# Patient Record
Sex: FEMALE | Race: BLACK OR AFRICAN AMERICAN | Employment: FULL TIME | URBAN - METROPOLITAN AREA
[De-identification: names, ages, dates, MRNs, and addresses within clinical notes are randomized per-mention and may not be internally consistent; named-entity substitution may affect disease eponyms.]

---

## 2021-11-30 ENCOUNTER — HOSPITAL ENCOUNTER (EMERGENCY)
Age: 26
Discharge: HOME | End: 2021-11-30
Attending: EMERGENCY MEDICINE
Payer: COMMERCIAL

## 2021-11-30 VITALS
RESPIRATION RATE: 16 BRPM | DIASTOLIC BLOOD PRESSURE: 65 MMHG | OXYGEN SATURATION: 100 % | BODY MASS INDEX: 33.99 KG/M2 | TEMPERATURE: 98.5 F | WEIGHT: 199.1 LBS | HEART RATE: 93 BPM | HEIGHT: 64 IN | SYSTOLIC BLOOD PRESSURE: 122 MMHG

## 2021-11-30 DIAGNOSIS — L02.31 ABSCESS OF BUTTOCK: Primary | ICD-10-CM

## 2021-11-30 PROCEDURE — 2500000003 HC RX 250 WO HCPCS: Performed by: EMERGENCY MEDICINE

## 2021-11-30 PROCEDURE — 6370000000 HC RX 637 (ALT 250 FOR IP): Performed by: EMERGENCY MEDICINE

## 2021-11-30 PROCEDURE — 10060 I&D ABSCESS SIMPLE/SINGLE: CPT

## 2021-11-30 PROCEDURE — 99283 EMERGENCY DEPT VISIT LOW MDM: CPT

## 2021-11-30 RX ORDER — SULFAMETHOXAZOLE AND TRIMETHOPRIM 800; 160 MG/1; MG/1
2 TABLET ORAL ONCE
Status: COMPLETED | OUTPATIENT
Start: 2021-11-30 | End: 2021-11-30

## 2021-11-30 RX ORDER — SULFAMETHOXAZOLE AND TRIMETHOPRIM 800; 160 MG/1; MG/1
2 TABLET ORAL 2 TIMES DAILY
Qty: 28 TABLET | Refills: 0 | Status: SHIPPED | OUTPATIENT
Start: 2021-11-30 | End: 2021-12-07

## 2021-11-30 RX ORDER — LIDOCAINE HYDROCHLORIDE 20 MG/ML
5 INJECTION, SOLUTION INFILTRATION; PERINEURAL ONCE
Status: COMPLETED | OUTPATIENT
Start: 2021-11-30 | End: 2021-11-30

## 2021-11-30 RX ORDER — IBUPROFEN 600 MG/1
600 TABLET ORAL EVERY 6 HOURS PRN
Qty: 40 TABLET | Refills: 0 | Status: SHIPPED | OUTPATIENT
Start: 2021-11-30 | End: 2022-05-20 | Stop reason: ALTCHOICE

## 2021-11-30 RX ADMIN — SULFAMETHOXAZOLE AND TRIMETHOPRIM 2 TABLET: 800; 160 TABLET ORAL at 23:06

## 2021-11-30 RX ADMIN — LIDOCAINE HYDROCHLORIDE 5 ML: 20 INJECTION, SOLUTION INFILTRATION; PERINEURAL at 23:06

## 2021-11-30 ASSESSMENT — PAIN DESCRIPTION - LOCATION: LOCATION: BUTTOCKS

## 2021-11-30 ASSESSMENT — PAIN DESCRIPTION - FREQUENCY: FREQUENCY: CONTINUOUS

## 2021-11-30 ASSESSMENT — PAIN SCALES - GENERAL
PAINLEVEL_OUTOF10: 4
PAINLEVEL_OUTOF10: 10
PAINLEVEL_OUTOF10: 10

## 2021-11-30 ASSESSMENT — PAIN DESCRIPTION - ONSET: ONSET: PROGRESSIVE

## 2021-11-30 ASSESSMENT — PAIN DESCRIPTION - DESCRIPTORS: DESCRIPTORS: THROBBING

## 2021-11-30 ASSESSMENT — PAIN DESCRIPTION - ORIENTATION: ORIENTATION: RIGHT

## 2021-11-30 ASSESSMENT — PAIN - FUNCTIONAL ASSESSMENT: PAIN_FUNCTIONAL_ASSESSMENT: 0-10

## 2021-11-30 ASSESSMENT — PAIN DESCRIPTION - PROGRESSION
CLINICAL_PROGRESSION: GRADUALLY IMPROVING
CLINICAL_PROGRESSION: GRADUALLY WORSENING

## 2021-11-30 ASSESSMENT — PAIN DESCRIPTION - PAIN TYPE: TYPE: ACUTE PAIN

## 2021-12-01 NOTE — ED PROVIDER NOTES
CHIEF COMPLAINT  Abscess (Buttock)      HISTORY OF PRESENT ILLNESS  Helena Castaneda  is a 27 y.o. female who presents to the ED at via private vehicle complaining of buttocks abscess. Patient states that she has noted increasing pain along the right medial buttocks over the last 2 days. She denies fevers, chills, or sweats. Pain is worse with sitting. No previous history of similar. There are no other complaints, modifying factors or associated symptoms. Nursing notes reviewed. Past medical history:  has no past medical history on file. Past surgical history:  has no past surgical history on file. Home medications:   Prior to Admission medications    Medication Sig Start Date End Date Taking? Authorizing Provider   ibuprofen (IBU) 600 MG tablet Take 1 tablet by mouth every 6 hours as needed for Pain 11/30/21  Yes Verlyn Ee, DO   sulfamethoxazole-trimethoprim (BACTRIM DS;SEPTRA DS) 800-160 MG per tablet Take 2 tablets by mouth 2 times daily for 7 days 11/30/21 12/7/21 Yes Verlyn Ee, DO       No Known Allergies    Social history:  reports that she has never smoked. She has never used smokeless tobacco. She reports current alcohol use. She reports that she does not use drugs. Family history:  History reviewed. No pertinent family history. REVIEW OF SYSTEMS  6 systems reviewed, pertinent positives per HPI otherwise noted to be negative    PHYSICAL EXAM  Vitals:    11/30/21 2234   BP: 122/65   Pulse: 93   Resp: 16   Temp: 98.5 °F (36.9 °C)   SpO2: 100%       GENERAL: Patient is well-developed, well-nourished,  no acute distress. Moderate apparent discomfort. Non toxic appearing. HEENT:  Normocephalic, atraumatic. PERRL. Conjunctiva appear normal.  External ears are normal.  MMM  NECK: Supple with normal ROM. Trachea midline  LUNGS:  Normal work of breathing. Speaking comfortably in full sentences. EXTREMITIES: 2+ distal pulses w/o edema.     MUSCULOSKELETAL:  Atraumatic extremities with normal ROM grossly. No obvious bony deformities. SKIN: 2 cm abscess noted along the medial aspect of the right buttocks. No evidence of perirectal involvement. Warm/dry. No rashes/lesions noted. PSYCHIATRIC: Patient is alert and oriented with normal affect  NEUROLOGIC: Cranial nerves grossly intact. Moves all extremities with equal strength. No gross sensory deficits. Answers questions/follows commands appropriately. ED COURSE/MDM  Nursing notes reviewed. Pt was given the following medications or treatments in the ED:       PROCEDURE:  INCISION & DRAINAGE  Dilip Plascencia or their surrogate had an opportunity to ask questions, and the risks, benefits, and alternatives were discussed. The abscess was prepped and draped to maintain a sterile field. A local anesthetic was used to completely anesthetize the abscess. A stab incision was made with significant purulent discharge. Approximately 10 to 15 cc of purulent discharge was able to be expressed from the abscess. There were no complications during the procedure. Bactrim provided. Clinical Impression  Based on the presenting complaint, history, and physical exam, multiple diagnoses were considered. Exam and workup here most c/w:  1. Abscess of buttock        I discussed with Dilip Plascencia the results of evaluation in the ED, diagnosis, care, and prognosis. The plan is to discharge to home. Patient is in agreement with plan and questions have been answered. I also discussed with Dilip Plascencia the reasons which may require a return visit and the importance of follow-up care. The patient is well-appearing, nontoxic, and improved at the time of discharge. Patient agrees to call to arrange follow-up care as directed. Shaysonalibonnie Plascencia understands to return immediately for worsening/change in symptoms.       Patient will be started on the following medications from the ED:  Discharge Medication List as of 11/30/2021 11:20 PM START taking these medications    Details   ibuprofen (IBU) 600 MG tablet Take 1 tablet by mouth every 6 hours as needed for Pain, Disp-40 tablet, R-0Print      sulfamethoxazole-trimethoprim (BACTRIM DS;SEPTRA DS) 800-160 MG per tablet Take 2 tablets by mouth 2 times daily for 7 days, Disp-28 tablet, R-0Print               Disposition  Pt is discharged in stable condition.     Disposition Vitals:  /65   Pulse 93   Temp 98.5 °F (36.9 °C) (Oral)   Resp 16   Ht 5' 4\" (1.626 m)   Wt 199 lb 1.6 oz (90.3 kg)   SpO2 100%   BMI 34.18 kg/m²                     Ana Aguirre DO  12/01/21 0017

## 2022-05-20 ENCOUNTER — HOSPITAL ENCOUNTER (EMERGENCY)
Age: 27
Discharge: HOME | End: 2022-05-20
Attending: EMERGENCY MEDICINE
Payer: COMMERCIAL

## 2022-05-20 ENCOUNTER — APPOINTMENT (OUTPATIENT)
Dept: GENERAL RADIOLOGY | Age: 27
End: 2022-05-20
Payer: COMMERCIAL

## 2022-05-20 VITALS
BODY MASS INDEX: 33.57 KG/M2 | RESPIRATION RATE: 12 BRPM | OXYGEN SATURATION: 98 % | HEIGHT: 65 IN | WEIGHT: 201.5 LBS | SYSTOLIC BLOOD PRESSURE: 143 MMHG | DIASTOLIC BLOOD PRESSURE: 83 MMHG | HEART RATE: 87 BPM | TEMPERATURE: 98.7 F

## 2022-05-20 DIAGNOSIS — M25.572 ACUTE LEFT ANKLE PAIN: Primary | ICD-10-CM

## 2022-05-20 PROCEDURE — 73610 X-RAY EXAM OF ANKLE: CPT

## 2022-05-20 PROCEDURE — 99283 EMERGENCY DEPT VISIT LOW MDM: CPT

## 2022-05-20 ASSESSMENT — PAIN - FUNCTIONAL ASSESSMENT: PAIN_FUNCTIONAL_ASSESSMENT: NONE - DENIES PAIN

## 2022-05-20 NOTE — ED PROVIDER NOTES
CHIEF COMPLAINT  Ankle Pain (left ankle pain and swelling for the past few days)      HISTORY OF PRESENT ILLNESS  Latia Ellis  is a 24 y.o. female who presents to the ED complaining of left ankle swelling that she has noticed over the past several days. She states she has a new job working as a  and is on her feet a lot. She has noticed some swelling and pain in the left ankle after she is on her feet all day. She denies any known injury. No fever. There are no other complaints, modifying factors or associated symptoms. Nursing notes reviewed. Past medical history:  has no past medical history on file. Past surgical history:  has no past surgical history on file. Home medications:   Prior to Admission medications    Not on File       No Known Allergies    Social history:  reports that she has never smoked. She has never used smokeless tobacco. She reports current alcohol use. She reports that she does not use drugs. Family history:  History reviewed. No pertinent family history. REVIEW OF SYSTEMS  6 systems reviewed, pertinent positives per HPI otherwise noted to be negative    PHYSICAL EXAM  Vitals:    05/20/22 1615   BP: (!) 143/83   Pulse: 87   Resp: 12   Temp: 98.7 °F (37.1 °C)   SpO2: 98%     GENERAL APPEARANCE: Awake and alert. Cooperative. No acute distress. HEENT:  Normocephalic, atraumatic. PERRL. Conjunctiva appear normal.  External ears are normal.  MMM  NECK: Supple with normal ROM. Trachea midline  HEART: Regular rate. LUNGS:  Normal work of breathing. Speaking comfortably in full sentences. ABDOMEN: Non-distended. EXTREMITIES: 2+ distal pulses w/o edema. MUSCULOSKELETAL:  Atraumatic extremities with normal ROM grossly. No obvious bony deformities. No appreciable swelling noted that is asymmetric. Both of her ankles are slightly swollen but they appear quite symmetric. No warmth or erythema over the left ankle.   Both feet are neurovascularly intact. No calf swelling or tenderness bilaterally. SKIN: Warm/dry. No rashes/lesions noted. PSYCHIATRIC: Patient is alert and oriented with normal affect  NEUROLOGIC: Cranial nerves grossly intact. Moves all extremities with equal strength. No gross sensory deficits. Answers questions/follows commands appropriately. ED COURSE/MDM  Nursing notes reviewed. Here the patient is afebrile with normal vitals. Patient is well appearing. Here the patient's ankles are quite symmetric. I really cannot appreciate much asymmetric swelling. She has trace swelling noted to both ankles. No erythema or warmth. I do not suspect a septic joint. No calf swelling or tenderness. I do not suspect DVT. Left ankle x-ray shows no bony injury. I have given her an Ace wrap to wear and instructed her to elevate her leg is much as possible and take a couple days off work and take NSAIDs. I have instructed her to also wear the Ace wrap while at work. This may help with her swelling. Primary care follow-up recommended. Clinical Impression  Based on the presenting complaint, history, and physical exam, multiple diagnoses were considered. Exam and workup here most c/w:  1. Acute left ankle pain        I discussed with the patient, the results of evaluation in the ED, diagnosis, care, and prognosis. The plan is to discharge to home. Patient is in agreement with plan and questions have been answered. I also discussed the reasons which may require a return visit and the importance of follow-up care with the patient. The patient is well-appearing, nontoxic, and improved at the time of discharge. Patient agrees to call to arrange follow-up care as directed. The patient understands to return immediately for worsening/change in symptoms. Patient will be started on the following medications from the ED:  There are no discharge medications for this patient.         Disposition  Pt is discharged in stable condition.     Disposition Vitals:  BP (!) 143/83   Pulse 87   Temp 98.7 °F (37.1 °C) (Oral)   Resp 12   Ht 5' 4.5\" (1.638 m)   Wt 201 lb 8 oz (91.4 kg)   SpO2 98%   BMI 34.05 kg/m²         Jj Ivey MD  05/21/22 8266

## 2022-05-20 NOTE — Clinical Note
Brigido Mills was seen and treated in our emergency department on 5/20/2022. She may return to work on 05/23/2022. If you have any questions or concerns, please don't hesitate to call.       Sharita Brown MD

## 2022-05-20 NOTE — ED NOTES
Pt states that she has been having bilateral feet pain for months since she changed jobs and is on her feet all the time and a few days ago she noticed that she had left ankle swelling with no known injury.      Viky Spencer RN  05/20/22 7662

## 2022-06-16 ENCOUNTER — HOSPITAL ENCOUNTER (EMERGENCY)
Age: 27
Discharge: LEFT BEFORE DISCHARGE | End: 2022-06-16

## 2022-06-16 VITALS
BODY MASS INDEX: 33.41 KG/M2 | DIASTOLIC BLOOD PRESSURE: 88 MMHG | SYSTOLIC BLOOD PRESSURE: 139 MMHG | HEART RATE: 83 BPM | TEMPERATURE: 98.8 F | OXYGEN SATURATION: 99 % | WEIGHT: 200.56 LBS | RESPIRATION RATE: 16 BRPM | HEIGHT: 65 IN

## 2022-06-16 NOTE — ED TRIAGE NOTES
26y/o female presents to the ED with left ankle pain. Pt states she was seen for the same 2wks ago. She states that she has been keeping elevated with no relief of swelling. Pt state she is able to bare weight on foot but is painful +swelling to ankle.  No deformity

## 2022-06-21 ENCOUNTER — HOSPITAL ENCOUNTER (EMERGENCY)
Age: 27
Discharge: HOME | End: 2022-06-21
Attending: EMERGENCY MEDICINE
Payer: COMMERCIAL

## 2022-06-21 VITALS
WEIGHT: 207.7 LBS | HEIGHT: 65 IN | HEART RATE: 88 BPM | TEMPERATURE: 98.2 F | BODY MASS INDEX: 34.6 KG/M2 | RESPIRATION RATE: 10 BRPM | SYSTOLIC BLOOD PRESSURE: 130 MMHG | DIASTOLIC BLOOD PRESSURE: 81 MMHG | OXYGEN SATURATION: 99 %

## 2022-06-21 DIAGNOSIS — M25.572 ACUTE LEFT ANKLE PAIN: Primary | ICD-10-CM

## 2022-06-21 PROCEDURE — 99283 EMERGENCY DEPT VISIT LOW MDM: CPT

## 2022-06-21 RX ORDER — IBUPROFEN 600 MG/1
600 TABLET ORAL EVERY 6 HOURS PRN
Qty: 40 TABLET | Refills: 0 | Status: SHIPPED | OUTPATIENT
Start: 2022-06-21

## 2022-06-22 NOTE — ED PROVIDER NOTES
CHIEF COMPLAINT  Left ankle pain. HISTORY OF PRESENT ILLNESS  Amisha Garrett  is a 24 y.o. female who presents to the ED at via private vehicle complaining of left ankle pain. Patient denies known trauma or injury. She reports that she has noted increasing left lateral ankle pain since starting her job at [employer] approximately 6 months ago. Patient states that she is on her feet all day long. She reports poor footwear as well as admitting to stating \"flat-footed. \"Patient was seen evaluated recently and had reportedly negative ankle imaging. Pain continues at this time. There are no other complaints, modifying factors or associated symptoms. Nursing notes reviewed. Past medical history:  has no past medical history on file. Past surgical history:  has no past surgical history on file. Home medications:   Prior to Admission medications    Medication Sig Start Date End Date Taking? Authorizing Provider   ibuprofen (IBU) 600 MG tablet Take 1 tablet by mouth every 6 hours as needed for Pain 6/21/22  Yes Alex Hoffmanari, DO       No Known Allergies    Social history:  reports that she has never smoked. She has never used smokeless tobacco. She reports current alcohol use. She reports that she does not use drugs. Family history:  No family history on file. REVIEW OF SYSTEMS  6 systems reviewed, pertinent positives per HPI otherwise noted to be negative    PHYSICAL EXAM  Vitals:    06/21/22 1944   BP: 130/81   Pulse: 88   Resp: 10   Temp: 98.2 °F (36.8 °C)   SpO2: 99%       GENERAL: Patient is well-developed, well-nourished,  no acute distress. Moderate apparent discomfort. Non toxic appearing. HEENT:  Normocephalic, atraumatic. PERRL. Conjunctiva appear normal.  External ears are normal.  MMM  NECK: Supple with normal ROM. Trachea midline  LUNGS:  Normal work of breathing. Speaking comfortably in full sentences. EXTREMITIES: 2+ distal pulses w/o edema. MUSCULOSKELETAL: Left lower extremity: Hip, and knee within normal limits. Full range of motion without difficulty. Left ankle: Full range of motion. Moderate discomfort with eversion. Point tenderness at the tip of the lateral malleolus. No gross swelling or erythema. No obvious swelling. Atraumatic extremities with normal ROM grossly. No obvious bony deformities. SKIN: Warm/dry. No rashes/lesions noted. PSYCHIATRIC: Patient is alert and oriented with normal affect  NEUROLOGIC: Cranial nerves grossly intact. Moves all extremities with equal strength. No gross sensory deficits. Answers questions/follows commands appropriately. ED COURSE/MDM  Nursing notes reviewed. Pt was given the following medications or treatments in the ED:     Left ankle brace provided. Clinical Impression  Based on the presenting complaint, history, and physical exam, multiple diagnoses were considered. Exam and workup here most c/w:  1. Acute left ankle pain        I discussed with Dilip Valencia the results of evaluation in the ED, diagnosis, care, and prognosis. The plan is to discharge to home. Patient is in agreement with plan and questions have been answered. I also discussed with Dilip Valencia the reasons which may require a return visit and the importance of follow-up care. The patient is well-appearing, nontoxic, and improved at the time of discharge. Patient agrees to call to arrange follow-up care as directed. Dilip Valencia understands to return immediately for worsening/change in symptoms. Patient will be started on the following medications from the ED:  New Prescriptions    IBUPROFEN (IBU) 600 MG TABLET    Take 1 tablet by mouth every 6 hours as needed for Pain         Disposition  Pt is discharged in stable condition.     Disposition Vitals:  /81   Pulse 88   Temp 98.2 °F (36.8 °C) (Oral)   Resp 10   Ht 5' 4.5\" (1.638 m)   Wt 207 lb 11.2 oz (94.2 kg)   SpO2 99%   BMI 35.10 kg/m²                    Rusty Good DO  06/21/22 2005

## 2022-09-03 ENCOUNTER — HOSPITAL ENCOUNTER (EMERGENCY)
Age: 27
Discharge: HOME | End: 2022-09-03
Attending: EMERGENCY MEDICINE
Payer: COMMERCIAL

## 2022-09-03 ENCOUNTER — APPOINTMENT (OUTPATIENT)
Dept: GENERAL RADIOLOGY | Age: 27
End: 2022-09-03
Payer: COMMERCIAL

## 2022-09-03 VITALS
HEIGHT: 65 IN | OXYGEN SATURATION: 99 % | HEART RATE: 78 BPM | TEMPERATURE: 97.9 F | BODY MASS INDEX: 33.49 KG/M2 | RESPIRATION RATE: 10 BRPM | WEIGHT: 201 LBS | DIASTOLIC BLOOD PRESSURE: 80 MMHG | SYSTOLIC BLOOD PRESSURE: 147 MMHG

## 2022-09-03 DIAGNOSIS — M79.604 RIGHT LEG PAIN: Primary | ICD-10-CM

## 2022-09-03 PROCEDURE — 99283 EMERGENCY DEPT VISIT LOW MDM: CPT

## 2022-09-03 PROCEDURE — 73552 X-RAY EXAM OF FEMUR 2/>: CPT

## 2022-09-03 NOTE — ED PROVIDER NOTES
[facility] PROVIDER NOTE    Patient Identification  Pt Name: Eliezer Smalls  MRN: [id number]  Erwingfilsa 1995  Date of evaluation: 9/3/2022  Provider: Lee Munoz MD  PCP: Ninfa Perkins MD    Chief Complaint  Leg Pain (Complains of right upper thigh pain. Onset around 0100. Took two Ibuprofen around 0400 with no relief. Has had no travel recently. Describes pain as burning.)      HPI  History provided by patient   This is a 24 y.o. female who presents to the ED for right upper thigh pain. Medial in location. No pain anywhere else. Unchanged by movement of the hip, knee, ankle, toes. No difficulty walking. Never had this before. No other symptoms. No fevers or chills or cough. Normal bowel and bladder movements. Denies prior medical problems. Yesterday she was up on her feet all day. She was wearing shorts that were very constricting at the top. No numbness or tingling. No long plane or car ride. No family history of blood clotting disorders. No recent surgeries. No trauma. No back pain. No saddle anesthesia    ROS  12 systems reviewed, pertinent positives/negatives per HPI otherwise noted to be negative. I have reviewed the following nursing documentation:  Allergies: Patient has no known allergies. Past medical history: History reviewed. No pertinent past medical history. Past surgical history: History reviewed. No pertinent surgical history. Home medications:   Previous Medications    IBUPROFEN (IBU) 600 MG TABLET    Take 1 tablet by mouth every 6 hours as needed for Pain       Social history:  reports that she has never smoked. She has never used smokeless tobacco. She reports current alcohol use. She reports that she does not use drugs. Family history:  History reviewed. No pertinent family history.       Exam  ED Triage Vitals [09/03/22 0748]   BP Temp Temp Source Heart Rate Resp SpO2 Height Weight   (!) 147/80 97.9 °F (36.6 °C) Oral 78 10 99 % 5' 4.5\" (1.638 m) 201 lb (91.2 kg)     Nursing note and vitals reviewed. Constitutional: In no acute distress  HENT:      Head: Normocephalic      Ears: External ears normal.      Nose: Nose normal.     Mouth: Membrane mucosa moist   Eyes: No discharge. Neck: Supple. Trachea midline. Cardiovascular: Regular rate. Warm extremities  Pulmonary/Chest: Effort normal. No respiratory distress. Abdominal: Soft. No distension. Nontender  : Deferred  Rectal: Deferred   Musculoskeletal: Moves all extremities. No gross deformity. 5 out of 5 strength bilateral hip, knee, ankle, big toe flexors/extensors. Normal sensation light palpation throughout lower extremities. 2+ DP/PT pulse. Normal patellar/Achilles reflexes  Neurological: Alert and oriented. Face symmetric. Speech is clear. Skin: Warm and dry. Psychiatric: Normal mood and affect. Behavior is normal.    Procedures        Radiology  VL Extremity Venous Right    (Results Pending)       Labs  No results found for this visit on 09/03/22. Screenings   [place] Coma Scale  Eye Opening: Spontaneous  Best Verbal Response: Oriented  Best Motor Response: Obeys commands  [place] Coma Scale Score: 15       MDM and ED Course  This is a 24 y.o. female who presents to the ED for right medial thigh burning discomfort. No swelling or rash noted. Could potentially be shingles if rash develops. No swelling to indicate DVT. Unfortunately we do not have DVT study available in this facility. Will refer patient to get this as outpatient. Good pulses and warm foot therefore doubt peripheral arterial disease. No trauma. Will obtain x-ray. No pain at the joints so I doubt effusion. She was wearing a very constricting set of shorts all day yesterday therefore this may be peripheral neuropathy.      Because of my low clinical suspicion and since well score is so low, I do not believe that it is in patient's best interest to receive empiric anticoagulation before ultrasound technology.         Jimmy Ann MD  09/03/22 3996

## 2022-09-03 NOTE — DISCHARGE INSTRUCTIONS
Please return if you have any new, worsening, or concerning symptoms like inability to eat/drink/walk, fevers, rash, swelling, trouble breathing, chest pain. Contact your primary care physician tomorrow to make a follow up appointment this week.  Talk about getting the ultrasound of your leg

## 2023-01-23 ENCOUNTER — APPOINTMENT (OUTPATIENT)
Dept: GENERAL RADIOLOGY | Age: 28
End: 2023-01-23
Payer: COMMERCIAL

## 2023-01-23 ENCOUNTER — HOSPITAL ENCOUNTER (EMERGENCY)
Age: 28
Discharge: HOME | End: 2023-01-23
Attending: EMERGENCY MEDICINE
Payer: COMMERCIAL

## 2023-01-23 VITALS
TEMPERATURE: 98 F | HEIGHT: 64 IN | RESPIRATION RATE: 16 BRPM | BODY MASS INDEX: 34.66 KG/M2 | WEIGHT: 203 LBS | DIASTOLIC BLOOD PRESSURE: 73 MMHG | HEART RATE: 96 BPM | SYSTOLIC BLOOD PRESSURE: 137 MMHG | OXYGEN SATURATION: 100 %
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
## 2023-01-23 DIAGNOSIS — M25.552 ACUTE HIP PAIN, LEFT: ICD-10-CM

## 2023-01-23 DIAGNOSIS — M79.18 LEFT BUTTOCK PAIN: Primary | ICD-10-CM

## 2023-01-23 LAB — HCG(URINE) PREGNANCY TEST: NEGATIVE

## 2023-01-23 PROCEDURE — 84703 CHORIONIC GONADOTROPIN ASSAY: CPT

## 2023-01-23 PROCEDURE — 99284 EMERGENCY DEPT VISIT MOD MDM: CPT

## 2023-01-23 PROCEDURE — 73502 X-RAY EXAM HIP UNI 2-3 VIEWS: CPT

## 2023-01-23 PROCEDURE — 6370000000 HC RX 637 (ALT 250 FOR IP): Performed by: EMERGENCY MEDICINE

## 2023-01-23 RX ORDER — METHOCARBAMOL 500 MG/1
1000 TABLET, FILM COATED ORAL ONCE
Status: COMPLETED | OUTPATIENT
Start: 2023-01-23 | End: 2023-01-23

## 2023-01-23 RX ORDER — ACETAMINOPHEN 325 MG/1
650 TABLET ORAL ONCE
Status: COMPLETED | OUTPATIENT
Start: 2023-01-23 | End: 2023-01-23

## 2023-01-23 RX ORDER — METHOCARBAMOL 500 MG/1
500-1000 TABLET, FILM COATED ORAL 3 TIMES DAILY PRN
Qty: 8 TABLET | Refills: 0 | Status: SHIPPED | OUTPATIENT
Start: 2023-01-23 | End: 2023-01-25

## 2023-01-23 RX ORDER — LIDOCAINE 4 G/G
1 PATCH TOPICAL DAILY
Status: DISCONTINUED | OUTPATIENT
Start: 2023-01-23 | End: 2023-01-23 | Stop reason: HOSPADM

## 2023-01-23 RX ADMIN — METHOCARBAMOL 1000 MG: 500 TABLET ORAL at 10:05

## 2023-01-23 RX ADMIN — ACETAMINOPHEN 650 MG: 325 TABLET ORAL at 10:05

## 2023-01-23 ASSESSMENT — ENCOUNTER SYMPTOMS
BACK PAIN: 0
VOMITING: 0
ABDOMINAL PAIN: 0
SHORTNESS OF BREATH: 0

## 2023-01-23 ASSESSMENT — PAIN DESCRIPTION - FREQUENCY: FREQUENCY: CONTINUOUS

## 2023-01-23 ASSESSMENT — PAIN DESCRIPTION - LOCATION: LOCATION: BUTTOCKS

## 2023-01-23 ASSESSMENT — PAIN - FUNCTIONAL ASSESSMENT
PAIN_FUNCTIONAL_ASSESSMENT: NONE - DENIES PAIN
PAIN_FUNCTIONAL_ASSESSMENT: 0-10

## 2023-01-23 ASSESSMENT — PAIN DESCRIPTION - PAIN TYPE: TYPE: ACUTE PAIN

## 2023-01-23 ASSESSMENT — PAIN DESCRIPTION - ORIENTATION: ORIENTATION: LEFT

## 2023-01-23 ASSESSMENT — PAIN SCALES - GENERAL: PAINLEVEL_OUTOF10: 9

## 2023-01-23 ASSESSMENT — PAIN DESCRIPTION - DESCRIPTORS: DESCRIPTORS: ACHING

## 2023-01-23 NOTE — DISCHARGE INSTRUCTIONS
Take Tylenol or ibuprofen as needed for pain. Do not take more than 3 g of Tylenol per day. Try over-the-counter Lidoderm patches as needed. Use ice for swelling. Try Robaxin as needed for muscle relaxer but do not drive with this. Follow-up with your primary doctor or orthopedics over the next 1 to 2 weeks if pain persist for repeat evaluation and additional imaging. Try massaging the area to see if this helps and stretching. Return to the emergency department for worsening buttock pain associated with weakness in the legs, new onset abdominal pain with vomiting, inability to walk, or any other concerns.

## 2023-01-23 NOTE — ED PROVIDER NOTES
[address] [address] ENCOUNTER        Pt Name: Eduar Dai  MRN: [id number]  [place] 1995  Date of evaluation: 1/23/2023  Provider: Kath Colorado MD  PCP: Lisa Kuo MD      [address] [place] [address]       Chief Complaint   Patient presents with    Buttocks Pain     Left buttock pain from fall       HISTORY OFPRESENT ILLNESS   (Location/Symptom, Timing/Onset, Context/Setting, Quality, Duration, Modifying Factors,Severity)  Note limiting factors. Eduar Dai is a 24 y.o. female presenting today due to concern for left buttock/hip pain since falling at work after slipping 2 days ago and landing directly on her left buttock and having pain with sitting ever since. She denied any pain prior to the slip and the pain came on relatively suddenly although it did seem to worsen over the last day. She denies any numbness or weakness in the legs. No abdominal pain or back pain. No chest pain. She did not hit her head and denies any headache or neck pain. She tried ibuprofen but states that was not helping much. Due to persistent left buttock pain, she came with her friend to the emergency department for further evaluation. She denies any concern with abscess. REVIEW OF SYSTEMS    (2-9 systems for level 4, 10 or more for level 5)     Review of Systems   Constitutional:  Negative for chills and fever. Respiratory:  Negative for shortness of breath. Cardiovascular:  Negative for chest pain and leg swelling. Gastrointestinal:  Negative for abdominal pain and vomiting. Genitourinary:  Negative for flank pain and pelvic pain. Musculoskeletal:  Positive for arthralgias (left hip) and gait problem (due to left buttock pain only). Negative for back pain, joint swelling and neck pain. Skin:  Negative for wound. Neurological:  Negative for syncope (no LOC), weakness, light-headedness, numbness and headaches.    Psychiatric/Behavioral:  Negative for confusion. Positives and Pertinent negatives as per HPI. PASTMEDICAL HISTORY   History reviewed. No pertinent past medical history. SURGICAL HISTORY     History reviewed. No pertinent surgical history. CURRENT MEDICATIONS       Discharge Medication List as of 1/23/2023 11:00 AM        CONTINUE these medications which have NOT CHANGED    Details   ibuprofen (IBU) 600 MG tablet Take 1 tablet by mouth every 6 hours as needed for Pain, Disp-40 tablet, R-0Normal             ALLERGIES     Patient has no known allergies. FAMILY HISTORY     History reviewed. No pertinent family history. SOCIAL HISTORY       Social History     Socioeconomic History    Marital status: Single     Spouse name: None    Number of children: None    Years of education: None    Highest education level: None   Tobacco Use    Smoking status: Never    Smokeless tobacco: Never   Substance and Sexual Activity    Alcohol use: Yes     Comment: socially    Drug use: No    Sexual activity: Yes     Partners: Male       SCREENINGS    Darin[place] Coma Scale  Eye Opening: Spontaneous  Best Verbal Response: Oriented  Best Motor Response: Obeys commands  [place] Coma Scale Score: 15           PHYSICAL EXAM    (up to 7 for level 4, 8 or more for level 5)     ED Triage Vitals [01/23/23 0919]   BP Temp Temp Source Heart Rate Resp SpO2 Height Weight   137/73 98 °F (36.7 °C) Oral 96 16 100 % 5' 4\" (1.626 m) 203 lb (92.1 kg)       Physical Exam  Vitals and nursing note reviewed. Exam conducted with a chaperone present Td Anne RN = for buttock exam - no vaginal exam or rectal exam done but did evaluate left buttock/hip). Constitutional:       General: She is awake. She is not in acute distress. Appearance: Normal appearance. She is well-developed and well-groomed. She is obese. She is not ill-appearing, toxic-appearing or diaphoretic. Interventions: She is not intubated. HENT:      Head: Normocephalic and atraumatic. Mouth/Throat:      Mouth: Mucous membranes are moist.   Eyes:      General:         Right eye: No discharge. Left eye: No discharge. Neck:      Trachea: No tracheal deviation. Cardiovascular:      Rate and Rhythm: Normal rate and regular rhythm. Pulses: Normal pulses. Dorsalis pedis pulses are 2+ on the right side and 2+ on the left side. Pulmonary:      Effort: Pulmonary effort is normal. No tachypnea, bradypnea, accessory muscle usage, prolonged expiration, respiratory distress or retractions. She is not intubated. Breath sounds: Normal air entry. No stridor. Chest:      Chest wall: No tenderness. Abdominal:      General: Bowel sounds are normal. There is no distension. Palpations: Abdomen is soft. Abdomen is not rigid. Tenderness: There is no abdominal tenderness. There is no right CVA tenderness, left CVA tenderness, guarding or rebound. Negative signs include McBurney's sign. Musculoskeletal:         General: No swelling, deformity or signs of injury. Normal range of motion. Cervical back: Full passive range of motion without pain, normal range of motion and neck supple. No edema, erythema, rigidity, tenderness or bony tenderness. Normal range of motion. Thoracic back: No tenderness or bony tenderness. Lumbar back: No edema, signs of trauma, tenderness or bony tenderness. Normal range of motion. Right hip: No deformity, lacerations, tenderness or bony tenderness. Normal range of motion. Left hip: Tenderness present. No deformity or lacerations. Normal range of motion. Left upper leg: Tenderness present. No swelling, edema, deformity, lacerations or bony tenderness. Left knee: No bony tenderness. Normal range of motion. No tenderness. Right lower leg: No edema. Left lower leg: No edema. Right ankle: Normal. No tenderness. Normal range of motion. Left ankle: Normal. No tenderness. Normal range of motion. Right foot: Normal range of motion and normal capillary refill. No swelling, deformity, tenderness or bony tenderness. Normal pulse. Left foot: Normal range of motion and normal capillary refill. No swelling, deformity, tenderness or bony tenderness. Normal pulse. Legs:    Skin:     General: Skin is warm and dry. Capillary Refill: Capillary refill takes less than 2 seconds. Coloration: Skin is not ashen, cyanotic, jaundiced or pale. Findings: No abrasion, bruising, ecchymosis, erythema, signs of injury, laceration, rash or wound. Neurological:      General: No focal deficit present. Mental Status: She is alert and oriented to person, place, and time. Mental status is at baseline. GCS: GCS eye subscore is 4. GCS verbal subscore is 5. GCS motor subscore is 6. Cranial Nerves: No dysarthria. Sensory: Sensation is intact. No sensory deficit. Motor: Motor function is intact. No weakness, tremor, atrophy, abnormal muscle tone or seizure activity. Gait: Gait is intact. Gait normal.      Comments: Normal strength and sensation to bilateral lower extremities with ankle dorsiflexion/plantarflexion and great toe dorsiflexion/plantarflexion 5 out of 5 strength bilateral   Psychiatric:         Attention and Perception: Attention normal.         Mood and Affect: Mood and affect normal. Mood is not anxious. Speech: Speech normal. Speech is not delayed or slurred. Behavior: Behavior normal. Behavior is cooperative. DIAGNOSTIC RESULTS   :    Labs Reviewed   PREGNANCY, URINE       All other labs were within normal range or not returned asof this dictation. EKG:  All EKG's are interpreted by the Emergency Department Physician who either signs or Co-signs this chart in the absence of a cardiologist.        RADIOLOGY:   Non-plain film images such as CT, Ultrasound and MRI are read by the radiologist. Plainradiographic images are visualized and preliminarily interpreted by the  ED Provider with the belowfindings:    I did review imaging and did not see any sign of any fracture and also reviewed radiologist report who agreed with this. Interpretation per the Radiologist below, if available at the time of this note:    XR HIP 2-3 VW W PELVIS LEFT   Final Result      No acute osseous abnormality            PROCEDURES   Unless otherwise noted below, none     Procedures    CRITICAL CARE TIME   N/A    CONSULTS:  None    EMERGENCY DEPARTMENT COURSE and DIFFERENTIAL DIAGNOSIS/MDM:   Vitals:    Vitals:    01/23/23 0919   BP: 137/73   Pulse: 96   Resp: 16   Temp: 98 °F (36.7 °C)   TempSrc: Oral   SpO2: 100%   Weight: 203 lb (92.1 kg)   Height: 5' 4\" (1.626 m)       Patient was given the following medications:  Medications   acetaminophen (TYLENOL) tablet 650 mg (650 mg Oral Given 1/23/23 1005)   methocarbamol (ROBAXIN) tablet 1,000 mg (1,000 mg Oral Given 1/23/23 1005)     Patient was evaluated due to falling on her buttock at work 2 days ago and having worsening left buttock/hip pain since with some trouble with walking. She denies any numbness or weakness in the legs and denies any back pain and story not concerning for any spinal cord injury. She denies any abdominal pain and abdominal exam was benign. She denies any urinary complaints. Urine pregnancy test was negative. Due to having left hip pain after fall, I did order a left hip x-ray which was negative for fracture. She received oral Robaxin for possible muscle spasm related to the fall along with oral Tylenol and a Lidoderm patch for pain to see if this helps. She reported it was a mechanical fall due to slipping at work and no concern for hitting her head or passing out. X-ray of the hip was obtained showing no significant findings.   Upon repeat assessment she reported slight improvement of discomfort but is aware that she will most likely feel sore for the next couple of days and to take Tylenol or ibuprofen as needed for pain and try Robaxin as needed as a muscle relaxer. She is aware that if she develops any worsening buttock pain with weakness in the leg, or any other new concerns related to her fall, then return to the ED, but otherwise follow-up with primary doctor or orthopedics as needed over the next week for any other concerns. She was well-appearing and in no acute distress at time of discharge and felt comfortable with this plan. She had no calf tenderness or swelling and story not concerning for DVT. No signs for skin infection on exam.  She denied any other traumatic concerns at this time except for her left buttock. I was the primary provider for the patient. The patient tolerated their visit well. The patient and / or the family were informed of the results of any tests, a time was given to answer questions. FINAL IMPRESSION      1. Left buttock pain    2. Acute hip pain, left    3.  Work related injury          DISPOSITION/PLAN   DISPOSITION Decision To Discharge 01/23/2023 09:47:36 AM-Discharged in improved, stable condition      PATIENT REFERRED TO:  [address] Emergency Department  [address] [facility] [address]  Go to   If symptoms worsen    Jennifer Zheng MD  [facility]  [phone]    In 3 days  As needed    Don Reynolds MD  [address] [facility]  [phone]    Call in 1 week  As needed for any concerns with your hip - orthopedics    DISCHARGEMEDICATIONS:  Discharge Medication List as of 1/23/2023 11:00 AM        START taking these medications    Details   methocarbamol (ROBAXIN) 500 MG tablet Take 1-2 tablets by mouth 3 times daily as needed (muscle spasm), Disp-8 tablet, R-0Print             DISCONTINUED MEDICATIONS:  Discharge Medication List as of 1/23/2023 11:00 AM                 (Please note that portions of this note were completed with a voicerecognition program.  Efforts were made to edit the dictations but occasionally words are mis-transcribed.)    Nikita Hobbs MD (electronically signed)            Nikita Hobbs MD  01/25/23 5617

## 2023-01-23 NOTE — ED NOTES
Patient to ed with complaints of left buttock pain after a slip and fall 2 days ago.      Alexandra Zheng RN  01/23/23 0112

## 2023-01-23 NOTE — ED NOTES
Patient given prescription, work note, discharge instructions verbal and written, patient verbalized understanding. Alert/oriented X4, Clear speech.   Patient exhibits no distress, ambulates with steady gait per self leaving unit, no further request.      Pk Tapia RN  01/23/23 3590

## 2023-01-23 NOTE — Clinical Note
Eduar Dai was seen and treated in our emergency department on 1/23/2023. She may return to work on 01/25/2023. May return sooner if feeling better     If you have any questions or concerns, please don't hesitate to call.       Kath Colorado MD

## 2023-09-05 ENCOUNTER — APPOINTMENT (OUTPATIENT)
Dept: GENERAL RADIOLOGY | Age: 28
End: 2023-09-05
Payer: COMMERCIAL

## 2023-09-05 ENCOUNTER — HOSPITAL ENCOUNTER (EMERGENCY)
Age: 28
Discharge: HOME | End: 2023-09-05
Attending: EMERGENCY MEDICINE
Payer: COMMERCIAL

## 2023-09-05 VITALS
SYSTOLIC BLOOD PRESSURE: 151 MMHG | OXYGEN SATURATION: 99 % | HEART RATE: 94 BPM | WEIGHT: 211 LBS | RESPIRATION RATE: 14 BRPM | BODY MASS INDEX: 35.16 KG/M2 | TEMPERATURE: 98.3 F | DIASTOLIC BLOOD PRESSURE: 86 MMHG | HEIGHT: 65 IN

## 2023-09-05 DIAGNOSIS — R03.0 ELEVATED BLOOD PRESSURE READING: ICD-10-CM

## 2023-09-05 DIAGNOSIS — S92.355A CLOSED NONDISPLACED FRACTURE OF FIFTH METATARSAL BONE OF LEFT FOOT, INITIAL ENCOUNTER: Primary | ICD-10-CM

## 2023-09-05 PROCEDURE — 99283 EMERGENCY DEPT VISIT LOW MDM: CPT

## 2023-09-05 PROCEDURE — 73630 X-RAY EXAM OF FOOT: CPT

## 2023-09-05 RX ORDER — IBUPROFEN 600 MG/1
600 TABLET ORAL EVERY 6 HOURS PRN
Qty: 40 TABLET | Refills: 0 | Status: SHIPPED | OUTPATIENT
Start: 2023-09-05

## 2023-09-05 ASSESSMENT — PAIN SCALES - GENERAL
PAINLEVEL_OUTOF10: 6
PAINLEVEL_OUTOF10: 10

## 2023-09-05 ASSESSMENT — PAIN DESCRIPTION - FREQUENCY
FREQUENCY: CONTINUOUS
FREQUENCY: CONTINUOUS

## 2023-09-05 ASSESSMENT — PAIN - FUNCTIONAL ASSESSMENT
PAIN_FUNCTIONAL_ASSESSMENT: 0-10
PAIN_FUNCTIONAL_ASSESSMENT: 0-10

## 2023-09-05 ASSESSMENT — PAIN DESCRIPTION - DESCRIPTORS
DESCRIPTORS: ACHING
DESCRIPTORS: ACHING

## 2023-09-05 ASSESSMENT — PAIN DESCRIPTION - PAIN TYPE
TYPE: ACUTE PAIN
TYPE: ACUTE PAIN

## 2023-09-05 ASSESSMENT — PAIN DESCRIPTION - ORIENTATION
ORIENTATION: LEFT
ORIENTATION: LEFT

## 2023-09-05 ASSESSMENT — PAIN DESCRIPTION - LOCATION
LOCATION: FOOT
LOCATION: FOOT

## 2023-09-05 NOTE — ED NOTES
Patient given prescription, work note, discharge instructions verbal and written, patient verbalized understanding. Alert/oriented X4, Clear speech.   Patient exhibits no distress, ambulates with crutches steady gait per self leaving unit, no further request.      Héctor Montemayor RN  09/05/23 3261

## 2023-09-05 NOTE — ED TRIAGE NOTES
Patient to ed with complaints of left foot pain after she \"rolled\" her foot tripping while running, foot is painful to walk on with swelling.

## 2023-09-27 ENCOUNTER — OFFICE VISIT (OUTPATIENT)
Dept: ORTHOPEDIC SURGERY | Age: 28
End: 2023-09-27
Payer: COMMERCIAL

## 2023-09-27 VITALS — BODY MASS INDEX: 35.16 KG/M2 | HEIGHT: 65 IN | WEIGHT: 211 LBS

## 2023-09-27 DIAGNOSIS — S92.356A CLOSED NONDISPLACED FRACTURE OF FIFTH METATARSAL BONE, UNSPECIFIED LATERALITY, INITIAL ENCOUNTER: Primary | ICD-10-CM

## 2023-09-27 DIAGNOSIS — M79.672 LEFT FOOT PAIN: ICD-10-CM

## 2023-09-27 PROCEDURE — 1036F TOBACCO NON-USER: CPT | Performed by: STUDENT IN AN ORGANIZED HEALTH CARE EDUCATION/TRAINING PROGRAM

## 2023-09-27 PROCEDURE — G8427 DOCREV CUR MEDS BY ELIG CLIN: HCPCS | Performed by: STUDENT IN AN ORGANIZED HEALTH CARE EDUCATION/TRAINING PROGRAM

## 2023-09-27 PROCEDURE — 99203 OFFICE O/P NEW LOW 30 MIN: CPT | Performed by: STUDENT IN AN ORGANIZED HEALTH CARE EDUCATION/TRAINING PROGRAM

## 2023-09-27 PROCEDURE — G8417 CALC BMI ABV UP PARAM F/U: HCPCS | Performed by: STUDENT IN AN ORGANIZED HEALTH CARE EDUCATION/TRAINING PROGRAM

## 2023-09-27 SDOH — HEALTH STABILITY: PHYSICAL HEALTH: ON AVERAGE, HOW MANY DAYS PER WEEK DO YOU ENGAGE IN MODERATE TO STRENUOUS EXERCISE (LIKE A BRISK WALK)?: 0 DAYS

## 2023-10-04 ENCOUNTER — HOSPITAL ENCOUNTER (OUTPATIENT)
Dept: PHYSICAL THERAPY | Age: 28
Setting detail: THERAPIES SERIES
Discharge: HOME | End: 2023-10-04
Payer: COMMERCIAL

## 2023-10-04 DIAGNOSIS — M79.672 LEFT FOOT PAIN: ICD-10-CM

## 2023-10-04 DIAGNOSIS — M25.572 ACUTE LEFT ANKLE PAIN: Primary | ICD-10-CM

## 2023-10-04 DIAGNOSIS — M25.672 ANKLE STIFFNESS, LEFT: ICD-10-CM

## 2023-10-04 PROCEDURE — 97110 THERAPEUTIC EXERCISES: CPT

## 2023-10-04 PROCEDURE — 97161 PT EVAL LOW COMPLEX 20 MIN: CPT

## 2023-10-04 NOTE — FLOWSHEET NOTE
30' 2  [] EVAL:LOW (31775 - Typically 20 minutes face-to-face) 1    [] Neuromusc. Re-ed (38931)    [] Re-Eval (48813)     [] Manual ([id number])    [] Estim Unattended (84487)     [] Ther. Act (07126)    [] Orma Socks. Traction (67744)     [] Gait (95500)    [] Dry Needle 1-2 muscle (25132)     [] Aquatic Therex (51175)    [] Dry Needle 3+ muscle (58578)     [] Iontophoresis (86431)    [] VASO (70196)     [] Ultrasound (20215)    [] Group Therapy (62942)     [] Estim Attended (45908)    [] Other: Total Timed Code Tx Minutes 30'        Total Treatment Minutes 50'        Charge Justification:  (30696) THERAPEUTIC EXERCISE - Provided verbal/tactile cueing for activities related to strengthening, flexibility, endurance, ROM performed to prevent loss of range of motion, maintain or improve muscular strength or increase flexibility, following either an injury or surgery. (90696) [facility]- Reviewed/Progressed HEP activities related to strengthening, flexibility, endurance, ROM performed to prevent loss of range of motion, maintain or improve muscular strength or increase flexibility, following either an injury or surgery. TREATMENT PLAN   Plan: POC Initiated today- see eval for details    Electronically Signed by Beltran Anne PT              Date: 10/04/2023     Note: If patient does not return for scheduled/recommended follow up visits, this note will serve as a discharge from care along with the most recent update on progress.

## 2023-10-11 ENCOUNTER — HOSPITAL ENCOUNTER (OUTPATIENT)
Dept: PHYSICAL THERAPY | Age: 28
Setting detail: THERAPIES SERIES
End: 2023-10-11
Payer: COMMERCIAL

## 2023-10-13 ENCOUNTER — HOSPITAL ENCOUNTER (OUTPATIENT)
Dept: PHYSICAL THERAPY | Age: 28
Setting detail: THERAPIES SERIES
Discharge: HOME | End: 2023-10-13
Payer: COMMERCIAL

## 2023-10-13 NOTE — FLOWSHEET NOTE
Physical Therapy  Cancellation/No-show Note  Patient Name:  Mary Harry  :  1995   Date:  10/13/2023  Cancelled visits to date: 0  No-shows to date: 0    For today's appointment patient:  [x]  Cancelled  []  Rescheduled appointment  []  No-show     Reason given by patient:  []  Patient ill  []  Conflicting appointment  []  No transportation    []  Conflict with work  []  No reason given  []  Other:     Comments:      Electronically signed by:  Renaldo Catherine PT, PT
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
## 2023-12-20 PROBLEM — R03.0 ELEVATED BLOOD PRESSURE READING: Status: ACTIVE | Noted: 2023-12-20

## 2023-12-20 PROBLEM — M25.551 RIGHT HIP PAIN: Status: ACTIVE | Noted: 2023-12-20

## 2023-12-20 PROBLEM — M54.50 ACUTE RIGHT-SIDED LOW BACK PAIN WITHOUT SCIATICA: Status: ACTIVE | Noted: 2023-12-20
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
## 2024-04-26 ENCOUNTER — HOSPITAL ENCOUNTER (EMERGENCY)
Age: 29
Discharge: HOME | End: 2024-04-26
Attending: EMERGENCY MEDICINE
Payer: COMMERCIAL

## 2024-04-26 VITALS
BODY MASS INDEX: 32.8 KG/M2 | HEART RATE: 97 BPM | WEIGHT: 192.1 LBS | RESPIRATION RATE: 14 BRPM | SYSTOLIC BLOOD PRESSURE: 144 MMHG | OXYGEN SATURATION: 99 % | HEIGHT: 64 IN | DIASTOLIC BLOOD PRESSURE: 88 MMHG | TEMPERATURE: 97.7 F

## 2024-04-26 DIAGNOSIS — J02.9 VIRAL PHARYNGITIS: Primary | ICD-10-CM

## 2024-04-26 LAB — S PYO AG THROAT QL: NEGATIVE

## 2024-04-26 PROCEDURE — 99283 EMERGENCY DEPT VISIT LOW MDM: CPT

## 2024-04-26 PROCEDURE — 87081 CULTURE SCREEN ONLY: CPT

## 2024-04-26 PROCEDURE — 87880 STREP A ASSAY W/OPTIC: CPT

## 2024-04-26 ASSESSMENT — ENCOUNTER SYMPTOMS
COUGH: 0
SORE THROAT: 1
EYE DISCHARGE: 0
CONSTIPATION: 0
SINUS PRESSURE: 0
CHEST TIGHTNESS: 0
STRIDOR: 0
ABDOMINAL PAIN: 0
DIARRHEA: 0
WHEEZING: 0
EYE PAIN: 0
NAUSEA: 0
PHOTOPHOBIA: 0
RHINORRHEA: 0
VOMITING: 0
VOICE CHANGE: 0
EYE REDNESS: 0
ABDOMINAL DISTENTION: 0
SHORTNESS OF BREATH: 0
TROUBLE SWALLOWING: 0
COLOR CHANGE: 0
RECTAL PAIN: 0
BACK PAIN: 0
BLOOD IN STOOL: 0
APNEA: 0

## 2024-04-26 ASSESSMENT — PAIN DESCRIPTION - DESCRIPTORS: DESCRIPTORS: SORE

## 2024-04-26 ASSESSMENT — PAIN SCALES - GENERAL: PAINLEVEL_OUTOF10: 8

## 2024-04-26 ASSESSMENT — PAIN - FUNCTIONAL ASSESSMENT: PAIN_FUNCTIONAL_ASSESSMENT: 0-10

## 2024-04-26 ASSESSMENT — PAIN DESCRIPTION - LOCATION: LOCATION: THROAT

## 2024-04-26 NOTE — DISCHARGE INSTRUCTIONS
Your strep test today was negative, but we will send it for culture. If this is positive at 48 hours we will notify you and send a prescription for antibiotics.   Tylenol and Ibuprofen as needed, as directed for pain and/or fever.   Encourage clear liquids.   Return if any further problems or concerns.

## 2024-04-26 NOTE — ED PROVIDER NOTES
Crsital Saenz is a generally healthy 28 year old female who presents with a sore throat for 3 days. She verbalizes a concern about possible strep, which she has had in the past, but denies frequent strep infections. She is able to swallow and drink adequately, and she denies fever. No ill contacts reported. She has no head or chest congestion, drainage, or cough.      BP (!) 144/88   Pulse 97   Temp 97.7 °F (36.5 °C) (Oral)   Resp 14   Ht 1.626 m (5' 4\")   Wt 87.1 kg (192 lb 1.6 oz)   SpO2 99%   BMI 32.97 kg/m²     I have reviewed the following from the nursing documentation:      Prior to Admission medications    Medication Sig Start Date End Date Taking? Authorizing Provider   tiZANidine (ZANAFLEX) 4 MG tablet Take 1 tablet by mouth every 6 hours as needed (Muscle spasm) 12/20/23   Mati Waldron DO   ibuprofen (IBU) 600 MG tablet Take 1 tablet by mouth every 6 hours as needed for Pain  Patient not taking: Reported on 9/27/2023 9/5/23   Thee Shelton II, DO       Allergies as of 04/26/2024    (No Known Allergies)       No past medical history on file.     Surgical History: No past surgical history on file.     Family History:  No family history on file.    Social History     Socioeconomic History    Marital status: Single     Spouse name: Not on file    Number of children: Not on file    Years of education: Not on file    Highest education level: Not on file   Occupational History    Not on file   Tobacco Use    Smoking status: Never    Smokeless tobacco: Never   Vaping Use    Vaping Use: Never used   Substance and Sexual Activity    Alcohol use: Not Currently     Comment: socially    Drug use: No    Sexual activity: Yes     Partners: Male   Other Topics Concern    Not on file   Social History Narrative    Not on file     Social Determinants of Health     Financial Resource Strain: Not on file   Food Insecurity: Not on file   Transportation Needs: Not on file   Physical Activity: Unknown (9/27/2023)

## 2024-04-28 LAB — S PYO THROAT QL CULT: NORMAL

## 2024-04-29 LAB — S PYO THROAT QL CULT: NORMAL

## 2024-05-16 ENCOUNTER — HOSPITAL ENCOUNTER (EMERGENCY)
Age: 29
Discharge: HOME | End: 2024-05-16
Attending: EMERGENCY MEDICINE
Payer: COMMERCIAL

## 2024-05-16 VITALS
OXYGEN SATURATION: 100 % | TEMPERATURE: 99 F | BODY MASS INDEX: 32.37 KG/M2 | RESPIRATION RATE: 10 BRPM | HEIGHT: 65 IN | WEIGHT: 194.3 LBS | HEART RATE: 102 BPM | SYSTOLIC BLOOD PRESSURE: 146 MMHG | DIASTOLIC BLOOD PRESSURE: 78 MMHG

## 2024-05-16 DIAGNOSIS — L02.31 ABSCESS, GLUTEAL, RIGHT: Primary | ICD-10-CM

## 2024-05-16 PROCEDURE — 99283 EMERGENCY DEPT VISIT LOW MDM: CPT

## 2024-05-16 RX ORDER — NAPROXEN 500 MG/1
500 TABLET ORAL 2 TIMES DAILY PRN
Qty: 20 TABLET | Refills: 0 | Status: SHIPPED | OUTPATIENT
Start: 2024-05-16 | End: 2024-05-26

## 2024-05-16 RX ORDER — AMOXICILLIN AND CLAVULANATE POTASSIUM 500; 125 MG/1; MG/1
1 TABLET, FILM COATED ORAL 3 TIMES DAILY
Qty: 30 TABLET | Refills: 0 | Status: SHIPPED | OUTPATIENT
Start: 2024-05-16 | End: 2024-05-26

## 2024-05-16 ASSESSMENT — ENCOUNTER SYMPTOMS
SINUS PRESSURE: 0
BACK PAIN: 0
STRIDOR: 0
VOICE CHANGE: 0
SORE THROAT: 0
APNEA: 0
ABDOMINAL DISTENTION: 0
TROUBLE SWALLOWING: 0
NAUSEA: 0
PHOTOPHOBIA: 0
VOMITING: 0
WHEEZING: 0
RHINORRHEA: 0
CHEST TIGHTNESS: 0
SHORTNESS OF BREATH: 0
COLOR CHANGE: 0
EYE REDNESS: 0
DIARRHEA: 0
EYE PAIN: 0
ABDOMINAL PAIN: 0
COUGH: 0
EYE DISCHARGE: 0

## 2024-05-16 ASSESSMENT — PAIN SCALES - GENERAL: PAINLEVEL_OUTOF10: 8

## 2024-05-16 ASSESSMENT — PAIN DESCRIPTION - DESCRIPTORS: DESCRIPTORS: ACHING

## 2024-05-16 ASSESSMENT — PAIN - FUNCTIONAL ASSESSMENT: PAIN_FUNCTIONAL_ASSESSMENT: 0-10

## 2024-05-16 ASSESSMENT — PAIN DESCRIPTION - PAIN TYPE: TYPE: ACUTE PAIN

## 2024-05-16 ASSESSMENT — PAIN DESCRIPTION - LOCATION: LOCATION: BUTTOCKS

## 2024-05-16 ASSESSMENT — PAIN DESCRIPTION - ORIENTATION: ORIENTATION: RIGHT

## 2024-05-16 NOTE — ED TRIAGE NOTES
Pt c/o abscess on R buttock for the past four days, has been soaking warm water and taking Motrin. Was helping but not any  longer. Seen here ~ one  yr ago for same and had drained.

## 2024-05-16 NOTE — ED NOTES
Patient given d/c instructions with return verbalization including medications. Emphasis on f/u and on completion of antibiotic., to return with worsening s/s. Patient ambulated to lobby with steady gait.

## 2024-05-16 NOTE — ED PROVIDER NOTES
hallucinations, self-injury, sleep disturbance and suicidal ideas.    All other systems reviewed and are negative.       Physical Exam  Constitutional:       Appearance: Normal appearance.   HENT:      Head: Normocephalic and atraumatic.   Eyes:      Pupils: Pupils are equal, round, and reactive to light.   Musculoskeletal:      Cervical back: Normal range of motion.   Skin:     General: Skin is warm and dry.      Capillary Refill: Capillary refill takes less than 2 seconds.      Comments: Exam of the perineum shows a moderate area of erythema and induration of the right medial buttocks without central fluctuance or area of drainage.    Neurological:      General: No focal deficit present.      Mental Status: She is alert.   Psychiatric:         Mood and Affect: Mood normal.         Behavior: Behavior normal.          Procedures     MDM  No results found for this visit on 05/16/24.      I estimate there is LOW risk for CELLULITIS, COMPARTMENT SYNDROME, NECROTIZING FASCIITIS, TENDON OR NEUROVASCULAR INJURY, or FOREIGN BODY, thus I consider the discharge disposition reasonable. Also, there is no evidence or peritonitis, sepsis, or toxicity. Cristal Saenz and I have discussed the diagnosis and risks, and we agree with discharging home to follow-up with their primary doctor. We also discussed returning to the Emergency Department immediately if new or worsening symptoms occur. We have discussed the symptoms which are most concerning (e.g., changing or worsening pain, fever, numbness, weakness, cool or painful digits) that necessitate immediate return.  Final Impression    1. Abscess, gluteal, right        Discharge Vital Signs:  Blood pressure (!) 146/78, pulse (!) 102, temperature 99 °F (37.2 °C), temperature source Oral, resp. rate 10, height 1.638 m (5' 4.5\"), weight 88.1 kg (194 lb 4.8 oz), SpO2 100 %.       Barbara Bedoya MD  05/16/24 0196

## 2025-06-14 ENCOUNTER — HOSPITAL ENCOUNTER (EMERGENCY)
Age: 30
Discharge: HOME | End: 2025-06-14
Attending: EMERGENCY MEDICINE
Payer: COMMERCIAL

## 2025-06-14 VITALS
OXYGEN SATURATION: 100 % | WEIGHT: 202.16 LBS | BODY MASS INDEX: 33.68 KG/M2 | DIASTOLIC BLOOD PRESSURE: 73 MMHG | RESPIRATION RATE: 16 BRPM | SYSTOLIC BLOOD PRESSURE: 133 MMHG | TEMPERATURE: 98.9 F | HEART RATE: 76 BPM | HEIGHT: 65 IN

## 2025-06-14 DIAGNOSIS — R21 RASH AND OTHER NONSPECIFIC SKIN ERUPTION: Primary | ICD-10-CM

## 2025-06-14 DIAGNOSIS — L03.012 PARONYCHIA OF FINGER OF LEFT HAND: ICD-10-CM

## 2025-06-14 PROCEDURE — 99283 EMERGENCY DEPT VISIT LOW MDM: CPT

## 2025-06-14 RX ORDER — PREDNISONE 10 MG/1
10 TABLET ORAL 2 TIMES DAILY
Qty: 10 TABLET | Refills: 0 | Status: SHIPPED | OUTPATIENT
Start: 2025-06-14 | End: 2025-06-19

## 2025-06-14 RX ORDER — CEPHALEXIN 500 MG/1
500 CAPSULE ORAL 4 TIMES DAILY
Qty: 28 CAPSULE | Refills: 0 | Status: SHIPPED | OUTPATIENT
Start: 2025-06-14 | End: 2025-06-21

## 2025-06-14 ASSESSMENT — ENCOUNTER SYMPTOMS
SORE THROAT: 0
SINUS PRESSURE: 0
WHEEZING: 0
VOMITING: 0
STRIDOR: 0
RHINORRHEA: 0
DIARRHEA: 0
EYE DISCHARGE: 0
BLOOD IN STOOL: 0
BACK PAIN: 0
COUGH: 0
VOICE CHANGE: 0
SHORTNESS OF BREATH: 0
CONSTIPATION: 0
APNEA: 0
TROUBLE SWALLOWING: 0
EYE PAIN: 0
CHEST TIGHTNESS: 0
NAUSEA: 0
ABDOMINAL DISTENTION: 0
ABDOMINAL PAIN: 0
RECTAL PAIN: 0
COLOR CHANGE: 0
PHOTOPHOBIA: 0
EYE REDNESS: 0

## 2025-06-14 NOTE — DISCHARGE INSTRUCTIONS
Take Benadryl as needed, as directed for itching.   Tylenol and/or Ibuprofen as needed, as directed for fever and/or pain.  Wear loose fitting clothing and avoid excessive heat exposure and sweating.   Return if symptoms change or worsen.

## 2025-06-14 NOTE — ED PROVIDER NOTES
Cristal Saenz is a generally healthy 30 year old female who presents to the ED for evaluation of a rash. She works as a  at the [employer], and she is outside a good deal of the time. She wears tight fitting leggings to work. This morning she woke up with a non-pruritic rash of her groin and inner thigh area, and some \"bumps\" of the skin between her nares and upper lip. She denies history of this in the past. Additionally, she has swelling and tenderness of the left index finger nail and of her right great toe that she would like to have evaluated.      /73   Pulse 76   Temp 98.9 °F (37.2 °C) (Oral)   Resp 16   Ht 1.638 m (5' 4.5\")   Wt 91.7 kg (202 lb 2.6 oz)   SpO2 100%   BMI 34.17 kg/m²     I have reviewed the following from the nursing documentation:      Prior to Admission medications    Medication Sig Start Date End Date Taking? Authorizing Provider   predniSONE (DELTASONE) 10 MG tablet Take 1 tablet by mouth 2 times daily for 5 days 6/14/25 6/19/25 Yes Barbara Bedoya MD   cephALEXin (KEFLEX) 500 MG capsule Take 1 capsule by mouth 4 times daily for 7 days 6/14/25 6/21/25 Yes Barbara Bedoya MD   vitamin D (ERGOCALCIFEROL) 1.25 MG (80682 UT) CAPS capsule Take 1 capsule by mouth once a week 6/25/24   Camryn Miranda PA       Allergies as of 06/14/2025    (No Known Allergies)       Past Medical History:   Diagnosis Date    Irritable bowel syndrome     Urinary incontinence         Surgical History: No past surgical history on file.     Family History:    Family History   Problem Relation Age of Onset    High Blood Pressure Mother        Social History     Socioeconomic History    Marital status: Single     Spouse name: Not on file    Number of children: Not on file    Years of education: Not on file    Highest education level: Not on file   Occupational History    Not on file   Tobacco Use    Smoking status: Never    Smokeless tobacco: Never   Vaping Use    Vaping status: Every  refill takes less than 2 seconds.      Comments: Face: clusters of tiny papules without erythema of the nasolabial folds bilaterally and the anterior nares. No crusting or drainage. Similar lesion behind left ear.   Thighs and inguinal area: General follicular irritation of the areas that are skin-to-skin without evidence of infection, cellulitis, abscess, or subcutaneous emphysema.   Paronychia of the left index finger. Similar right great toe.    Neurological:      General: No focal deficit present.      Mental Status: She is alert.   Psychiatric:         Mood and Affect: Mood normal.         Behavior: Behavior normal.         MDM   No results found for this visit on 06/14/25.      I estimate there is LOW risk for CELLULITIS, COMPARTMENT SYNDROME, NECROTIZING FASCIITIS, TENDON OR NEUROVASCULAR INJURY, or FOREIGN BODY, thus I consider the discharge disposition reasonable. Also, there is no evidence or peritonitis, sepsis, or toxicity. Cristal Saenz and I have discussed the diagnosis and risks, and we agree with discharging home to follow-up with their primary doctor. We also discussed returning to the Emergency Department immediately if new or worsening symptoms occur. We have discussed the symptoms which are most concerning (e.g., changing or worsening pain, fever, numbness, weakness, cool or painful digits) that necessitate immediate return.  Final Impression    1. Rash and other nonspecific skin eruption    2. Paronychia of finger of left hand        Discharge Vital Signs:  Blood pressure 133/73, pulse 76, temperature 98.9 °F (37.2 °C), temperature source Oral, resp. rate 16, height 1.638 m (5' 4.5\"), weight 91.7 kg (202 lb 2.6 oz), SpO2 100%.         Barbara Bedoya MD  06/14/25 5267